# Patient Record
Sex: MALE | Race: OTHER | NOT HISPANIC OR LATINO
[De-identification: names, ages, dates, MRNs, and addresses within clinical notes are randomized per-mention and may not be internally consistent; named-entity substitution may affect disease eponyms.]

---

## 2021-05-11 PROBLEM — Z00.00 ENCOUNTER FOR PREVENTIVE HEALTH EXAMINATION: Status: ACTIVE | Noted: 2021-05-11

## 2021-05-12 ENCOUNTER — APPOINTMENT (OUTPATIENT)
Dept: OTOLARYNGOLOGY | Facility: CLINIC | Age: 21
End: 2021-05-12
Payer: COMMERCIAL

## 2021-05-12 VITALS
HEART RATE: 57 BPM | HEIGHT: 75 IN | TEMPERATURE: 96.6 F | DIASTOLIC BLOOD PRESSURE: 85 MMHG | BODY MASS INDEX: 21.88 KG/M2 | SYSTOLIC BLOOD PRESSURE: 141 MMHG | WEIGHT: 176 LBS

## 2021-05-12 DIAGNOSIS — Z78.9 OTHER SPECIFIED HEALTH STATUS: ICD-10-CM

## 2021-05-12 PROCEDURE — 31231 NASAL ENDOSCOPY DX: CPT

## 2021-05-12 PROCEDURE — 99204 OFFICE O/P NEW MOD 45 MIN: CPT | Mod: 25

## 2021-05-12 NOTE — DATA REVIEWED
[de-identified] : CT Sinus 4/2020:\par FINDINGS:\par \par The bones adjacent to the sinuses including the cribriform plate, tegmen ethmoidalis, and lamina papyracea are intact. There is a symmetric configuration to the sinocranial junction. \par \par There is noted pneumatization of the anterior clinoid process on the left. There is a sellar pattern of sphenoid sinus pneumatization on the left. Pneumatization of the left sphenoid sinus extends into the left pterygoid process and greater wing of sphenoid. There is air cell pneumatization noted superior to the notches for the anterior ethmoid arteries.\par \par The frontal sinuses demonstrate peripheral mucosal thickening, predominantly along the floors of the sinuses. Mucosal thickening partially occludes the right frontal ethmoidal recess along its upper aspect. Mucosal thickening narrows the upper portion of the left frontal ethmoidal recess. Agger nasi cells are clear. Agger nasi/frontal cells result in anatomic frontal sinus outflow tract narrowing on the right.\par \par The ethmoid sinuses demonstrate scattered foci mucosal thickening in the anterior posterior ethmoid sinus air cells. The medial walls of the anterior ethmoid labyrinth contact the lateral aspect of the vertical bony struts of the middle turbinates. \par \par The maxillary sinuses demonstrate bilateral mucus retention cysts/polyps. Also noted is mucosal thickening along the alveolar recess of the left maxillary sinus which measures 4 mm. Mucosal thickening occludes the right maxillary ostium and narrows the right infundibulum. Mucosal thickening narrows the left maxillary ostium. Left infundibulum is patent Prominent ethmoid bulla results in anatomic narrowing of the ostiomeatal units. Mucosal thickening results in partial opacification of the middle meati.\par \par Left sphenoid sinus is dominant and crosses the midline.. Mucosal thickening noted along the anterior walls of the sphenoid sinuses with occlusion of the sphenoid ostia. Mucosal thickening narrows the left sphenoethmoidal recess and occludes the right sphenoethmoidal recess. The shared osseous partitions of the sphenoid sinuses with the carotid canals are well mineralized.\par \par There is noted mild undulating deviation of the nasal septum. Assessment of the severity of any cartilaginous septal deviation is better accomplished by direct visualization . There is a mucosal hypertrophy of the inferior turbinates and middle turbinates . There is extensive mucosal contact between the turbinates and the adjacent nasal septum resulting in partial obstruction of the nasal air passages.\par \par The visualized intraorbital, intracranial structures and high nasopharyngeal soft tissues are unremarkable. Tympanomastoid cavities are free of mucosal disease.\par \par IMPRESSION: \par \par Nasal turbinate hypertrophy.\par \par Multifocal inflammatory changes are noted in the paranasal sinuses and sinus drainage pathways.\par \par Undulating deviation of nasal septum.\par \par Anatomic variants which may predispose to impaired drainage of the paranasal sinuses and recurrent/persistent episodes of inflammatory disease.

## 2021-05-12 NOTE — ASSESSMENT
[FreeTextEntry1] : 20M here for initial evaluation. For years, he c/o nasal congestion/obstruction where he is never able to breathe from his nose. This affects both sides equally. He has tried all sorts of nasal sprays without improvement. There is no pain, green drainage or foul nasal odor. He is unable to smell due to the obstruction and his sleep quality is terrible. There are no known allergies, no hayfever sx.\par CT sinus shows severe inferior turbinate hypertrophy w osteitic bone, polypoid osteomeatal obstruction, narrow nasal cavities w mild left septal deflection and scattered anterior ethmoid and right frontal outflow mucosal thickening. On exam, nasal endoscopy shows severe inferior turbinate hypertrophy w complete nasal airway obstruction; they only decongest mildly well. There is diffuse boggy sinonasal mucosal edema and polypoid change of both middle turbinates w middle meatal obstruction and clear mucus.\par He has severe nasal obstruction primarily from huge inferior turbinates, severe rhinitis and central compartment polyps. There is also mild septal deviation contributing, given his narrow nasal cavities.\par At this point, I would recommend surgery (turbinate reduction, septoplasty, ESS) to maximize nasal airway and middle meatal patency and improve his persistent sx. All questions answered and he wishes to proceed. Plan for OR in the next 1-2 months.

## 2021-05-12 NOTE — PROCEDURE
[FreeTextEntry3] : Nasal Endoscopy:\par severe turbinate hypertrophy w complete nasal airway obstruction --> decongest mildly well\par diffuse boggy sinonasal mucosal edema\par polypoid change of both MTs w middle meatal obstruction\par clear mucus\par no mucopus or polyps

## 2021-05-12 NOTE — HISTORY OF PRESENT ILLNESS
[de-identified] : 20M here for initial evaluation.\par \par For years, he c/o nasal congestion/obstruction where he is never able to breathe from his nose. This affects both sides equally. He has tried all sorts of nasal sprays without improvement. There is no pain, green drainage or foul nasal odor. He is unable to smell due to the obstruction and his sleep quality is terrible.\par No known allergies, no hayfever sx.\par \par CT Sinus 4/1/21 (I reviewed images):\par -severe inferior turbinate hypertrophy w osteitic bone\par -polypoid OMC obstruction and polypoid change of MTs\par -narrow nasal cavities w mild left septal deflection\par -scattered anterior ethmoid and right frontal outflow mucosal thickening\par \par ROS otherwise unremarkable.

## 2021-05-12 NOTE — CONSULT LETTER
[Dear  ___] : Dear  [unfilled], [Courtesy Letter:] : I had the pleasure of seeing your patient, [unfilled], in my office today. [Consult Closing:] : Thank you very much for allowing me to participate in the care of this patient.  If you have any questions, please do not hesitate to contact me. [Sincerely,] : Sincerely, [FreeTextEntry3] : Faraz Betancur MD\par Department of Otolaryngology - Head and Neck Surgery\par [facility]

## 2021-06-11 ENCOUNTER — NON-APPOINTMENT (OUTPATIENT)
Age: 21
End: 2021-06-11

## 2021-06-11 ENCOUNTER — APPOINTMENT (OUTPATIENT)
Dept: INTERNAL MEDICINE | Facility: CLINIC | Age: 21
End: 2021-06-11
Payer: MEDICAID

## 2021-06-11 VITALS
HEART RATE: 61 BPM | SYSTOLIC BLOOD PRESSURE: 119 MMHG | OXYGEN SATURATION: 98 % | BODY MASS INDEX: 22.01 KG/M2 | WEIGHT: 177 LBS | TEMPERATURE: 98.6 F | HEIGHT: 75 IN | DIASTOLIC BLOOD PRESSURE: 74 MMHG

## 2021-06-11 DIAGNOSIS — Z01.812 ENCOUNTER FOR PREPROCEDURAL LABORATORY EXAMINATION: ICD-10-CM

## 2021-06-11 DIAGNOSIS — Z01.818 ENCOUNTER FOR OTHER PREPROCEDURAL EXAMINATION: ICD-10-CM

## 2021-06-11 PROCEDURE — 99204 OFFICE O/P NEW MOD 45 MIN: CPT | Mod: 25

## 2021-06-11 PROCEDURE — 93000 ELECTROCARDIOGRAM COMPLETE: CPT

## 2021-06-12 LAB
ALBUMIN SERPL ELPH-MCNC: 4.7 G/DL
ALP BLD-CCNC: 110 U/L
ALT SERPL-CCNC: 13 U/L
ANION GAP SERPL CALC-SCNC: 12 MMOL/L
APPEARANCE: CLEAR
APTT BLD: 33.6 SEC
AST SERPL-CCNC: 15 U/L
BASOPHILS # BLD AUTO: 0.03 K/UL
BASOPHILS NFR BLD AUTO: 0.4 %
BILIRUB SERPL-MCNC: 1.3 MG/DL
BILIRUBIN URINE: NEGATIVE
BLOOD URINE: NEGATIVE
BUN SERPL-MCNC: 12 MG/DL
CALCIUM SERPL-MCNC: 9.8 MG/DL
CHLORIDE SERPL-SCNC: 101 MMOL/L
CO2 SERPL-SCNC: 27 MMOL/L
COLOR: YELLOW
CREAT SERPL-MCNC: 0.95 MG/DL
EOSINOPHIL # BLD AUTO: 0.54 K/UL
EOSINOPHIL NFR BLD AUTO: 7 %
GLUCOSE QUALITATIVE U: NEGATIVE
GLUCOSE SERPL-MCNC: 107 MG/DL
HCT VFR BLD CALC: 46.8 %
HGB BLD-MCNC: 15.3 G/DL
IMM GRANULOCYTES NFR BLD AUTO: 0.3 %
INR PPP: 1.04 RATIO
KETONES URINE: NORMAL
LEUKOCYTE ESTERASE URINE: NEGATIVE
LYMPHOCYTES # BLD AUTO: 1.97 K/UL
LYMPHOCYTES NFR BLD AUTO: 25.6 %
MAN DIFF?: NORMAL
MCHC RBC-ENTMCNC: 29.5 PG
MCHC RBC-ENTMCNC: 32.7 GM/DL
MCV RBC AUTO: 90.2 FL
MONOCYTES # BLD AUTO: 0.64 K/UL
MONOCYTES NFR BLD AUTO: 8.3 %
NEUTROPHILS # BLD AUTO: 4.49 K/UL
NEUTROPHILS NFR BLD AUTO: 58.4 %
NITRITE URINE: NEGATIVE
PH URINE: 5.5
PLATELET # BLD AUTO: 333 K/UL
POTASSIUM SERPL-SCNC: 4.4 MMOL/L
PROT SERPL-MCNC: 7.6 G/DL
PROTEIN URINE: NORMAL
PT BLD: 12.3 SEC
RBC # BLD: 5.19 M/UL
RBC # FLD: 12.2 %
SODIUM SERPL-SCNC: 140 MMOL/L
SPECIFIC GRAVITY URINE: 1.03
UROBILINOGEN URINE: NORMAL
WBC # FLD AUTO: 7.69 K/UL

## 2021-06-12 NOTE — HISTORY OF PRESENT ILLNESS
[No Pertinent Cardiac History] : no history of aortic stenosis, atrial fibrillation, coronary artery disease, recent myocardial infarction, or implantable device/pacemaker [No Pertinent Pulmonary History] : no history of asthma, COPD, sleep apnea, or smoking [No Adverse Anesthesia Reaction] : no adverse anesthesia reaction in self or family member [(Patient denies any chest pain, claudication, dyspnea on exertion, orthopnea, palpitations or syncope)] : Patient denies any chest pain, claudication, dyspnea on exertion, orthopnea, palpitations or syncope [Excellent (>10 METs)] : Excellent (>10 METs) [Chronic Anticoagulation] : no chronic anticoagulation [Chronic Kidney Disease] : no chronic kidney disease [Diabetes] : no diabetes [FreeTextEntry1] : turbinate reduction, septoplasty, ESS [FreeTextEntry2] : 6/28/2021 [FreeTextEntry3] : Dr. Hyatt [FreeTextEntry4] : h/o recurrent nasal congestion, TEJAS, and asnosmia from turbinate hypertrophy and mucosal thickening, no improvement w/ nasal sprays or conservative treatment. Pt now scheduled for surgical management.

## 2021-06-27 ENCOUNTER — TRANSCRIPTION ENCOUNTER (OUTPATIENT)
Age: 21
End: 2021-06-27

## 2021-06-28 ENCOUNTER — TRANSCRIPTION ENCOUNTER (OUTPATIENT)
Age: 21
End: 2021-06-28

## 2021-06-28 ENCOUNTER — OUTPATIENT (OUTPATIENT)
Dept: OUTPATIENT SERVICES | Facility: HOSPITAL | Age: 21
LOS: 1 days | Discharge: HOME | End: 2021-06-28
Payer: MEDICAID

## 2021-06-28 ENCOUNTER — APPOINTMENT (OUTPATIENT)
Dept: OTOLARYNGOLOGY | Facility: HOSPITAL | Age: 21
End: 2021-06-28

## 2021-06-28 ENCOUNTER — RESULT REVIEW (OUTPATIENT)
Age: 21
End: 2021-06-28

## 2021-06-28 LAB
GRAM STN FLD: SIGNIFICANT CHANGE UP
GRAM STN FLD: SIGNIFICANT CHANGE UP
SARS-COV-2 RNA SPEC QL NAA+PROBE: NEGATIVE — SIGNIFICANT CHANGE UP
SPECIMEN SOURCE: SIGNIFICANT CHANGE UP
SPECIMEN SOURCE: SIGNIFICANT CHANGE UP

## 2021-06-28 PROCEDURE — 88300 SURGICAL PATH GROSS: CPT | Mod: 26,59

## 2021-06-28 PROCEDURE — 88305 TISSUE EXAM BY PATHOLOGIST: CPT | Mod: 26

## 2021-06-28 PROCEDURE — 88304 TISSUE EXAM BY PATHOLOGIST: CPT | Mod: 26

## 2021-06-28 PROCEDURE — 31240 NSL/SNS NDSC CNCH BULL RESCJ: CPT | Mod: 50

## 2021-06-28 PROCEDURE — 31256 EXPLORATION MAXILLARY SINUS: CPT | Mod: 50

## 2021-06-28 PROCEDURE — 31253 NSL/SINS NDSC TOTAL: CPT | Mod: 50

## 2021-06-28 PROCEDURE — 30130 EXCISE INFERIOR TURBINATE: CPT | Mod: 50

## 2021-06-28 PROCEDURE — 30520 REPAIR OF NASAL SEPTUM: CPT

## 2021-06-28 PROCEDURE — 61782 SCAN PROC CRANIAL EXTRA: CPT

## 2021-06-28 RX ORDER — CEFUROXIME AXETIL 500 MG/1
500 TABLET ORAL
Qty: 16 | Refills: 0 | Status: ACTIVE | COMMUNITY
Start: 2021-06-28 | End: 1900-01-01

## 2021-06-28 RX ORDER — OXYCODONE AND ACETAMINOPHEN 5; 325 MG/1; MG/1
5-325 TABLET ORAL
Qty: 30 | Refills: 0 | Status: ACTIVE | COMMUNITY
Start: 2021-06-28 | End: 1900-01-01

## 2021-06-28 RX ORDER — SODIUM CHLORIDE 0.65 %
0.65 AEROSOL, SPRAY (ML) NASAL
Qty: 2 | Refills: 5 | Status: ACTIVE | COMMUNITY
Start: 2021-06-28 | End: 1900-01-01

## 2021-06-30 LAB
CULTURE RESULTS: SIGNIFICANT CHANGE UP
SPECIMEN SOURCE: SIGNIFICANT CHANGE UP

## 2021-07-04 LAB
CULTURE RESULTS: SIGNIFICANT CHANGE UP
SPECIMEN SOURCE: SIGNIFICANT CHANGE UP

## 2021-07-06 ENCOUNTER — APPOINTMENT (OUTPATIENT)
Dept: OTOLARYNGOLOGY | Facility: CLINIC | Age: 21
End: 2021-07-06
Payer: MEDICAID

## 2021-07-06 PROCEDURE — 31237 NSL/SINS NDSC SURG BX POLYPC: CPT | Mod: 58

## 2021-07-06 PROCEDURE — 99024 POSTOP FOLLOW-UP VISIT: CPT

## 2021-07-06 NOTE — CONSULT LETTER
[Dear  ___] : Dear  [unfilled], [Courtesy Letter:] : I had the pleasure of seeing your patient, [unfilled], in my office today. [Consult Closing:] : Thank you very much for allowing me to participate in the care of this patient.  If you have any questions, please do not hesitate to contact me. [Sincerely,] : Sincerely, [FreeTextEntry3] : Faraz Betancur MD\par Department of Otolaryngology - Head and Neck Surgery\par [facility]

## 2021-07-06 NOTE — PROCEDURE
[FreeTextEntry3] : doyles and propels removed\par \par Nasal Endoscopy:\par abundant coagulum and debris removed\par septum intact and midline\par nasal airways patent\par middle meati narrowed but patent\par

## 2021-07-06 NOTE — HISTORY OF PRESENT ILLNESS
[de-identified] : 20M here in first postoperative visit s/p SMR/ITR/ESS (maxillary, ethmoid, frontal) for nasal obstruction, central compartment disease and sinusitis. Intraoperatively, very osteitic middle and inferior turbinates and polypoid middle meatal disease.\par \par He is very congested since surgery with bloody drainage. He took the medication as prescribed.\par \par Pathology: pending\par \par ROS otherwise unremarkable.

## 2021-07-06 NOTE — DATA REVIEWED
[de-identified] : CT Sinus 4/2020:\par FINDINGS:\par \par The bones adjacent to the sinuses including the cribriform plate, tegmen ethmoidalis, and lamina papyracea are intact. There is a symmetric configuration to the sinocranial junction. \par \par There is noted pneumatization of the anterior clinoid process on the left. There is a sellar pattern of sphenoid sinus pneumatization on the left. Pneumatization of the left sphenoid sinus extends into the left pterygoid process and greater wing of sphenoid. There is air cell pneumatization noted superior to the notches for the anterior ethmoid arteries.\par \par The frontal sinuses demonstrate peripheral mucosal thickening, predominantly along the floors of the sinuses. Mucosal thickening partially occludes the right frontal ethmoidal recess along its upper aspect. Mucosal thickening narrows the upper portion of the left frontal ethmoidal recess. Agger nasi cells are clear. Agger nasi/frontal cells result in anatomic frontal sinus outflow tract narrowing on the right.\par \par The ethmoid sinuses demonstrate scattered foci mucosal thickening in the anterior posterior ethmoid sinus air cells. The medial walls of the anterior ethmoid labyrinth contact the lateral aspect of the vertical bony struts of the middle turbinates. \par \par The maxillary sinuses demonstrate bilateral mucus retention cysts/polyps. Also noted is mucosal thickening along the alveolar recess of the left maxillary sinus which measures 4 mm. Mucosal thickening occludes the right maxillary ostium and narrows the right infundibulum. Mucosal thickening narrows the left maxillary ostium. Left infundibulum is patent Prominent ethmoid bulla results in anatomic narrowing of the ostiomeatal units. Mucosal thickening results in partial opacification of the middle meati.\par \par Left sphenoid sinus is dominant and crosses the midline.. Mucosal thickening noted along the anterior walls of the sphenoid sinuses with occlusion of the sphenoid ostia. Mucosal thickening narrows the left sphenoethmoidal recess and occludes the right sphenoethmoidal recess. The shared osseous partitions of the sphenoid sinuses with the carotid canals are well mineralized.\par \par There is noted mild undulating deviation of the nasal septum. Assessment of the severity of any cartilaginous septal deviation is better accomplished by direct visualization . There is a mucosal hypertrophy of the inferior turbinates and middle turbinates . There is extensive mucosal contact between the turbinates and the adjacent nasal septum resulting in partial obstruction of the nasal air passages.\par \par The visualized intraorbital, intracranial structures and high nasopharyngeal soft tissues are unremarkable. Tympanomastoid cavities are free of mucosal disease.\par \par IMPRESSION: \par \par Nasal turbinate hypertrophy.\par \par Multifocal inflammatory changes are noted in the paranasal sinuses and sinus drainage pathways.\par \par Undulating deviation of nasal septum.\par \par Anatomic variants which may predispose to impaired drainage of the paranasal sinuses and recurrent/persistent episodes of inflammatory disease.

## 2021-07-06 NOTE — ASSESSMENT
[FreeTextEntry1] : 20M here in first postoperative visit s/p SMR/ITR/ESS (maxillary, ethmoid, frontal) for nasal obstruction, central compartment disease and sinusitis. Intraoperatively, very osteitic middle and inferior turbinates and polypoid middle meatal disease. He is very congested since surgery with bloody drainage. He took the medication as prescribed.\par On exam, nasal endoscopy shows well healing postoperative changes with patent nasal airways and middle meati. Abundant clot and coagulum was removed during debridement after which he felt much better.\par He is doing well. To start daily budesonide rinses. Advance activity. RTO 4 weeks in routine followup. \par \par

## 2021-07-09 LAB — SURGICAL PATHOLOGY STUDY: SIGNIFICANT CHANGE UP

## 2021-07-12 ENCOUNTER — NON-APPOINTMENT (OUTPATIENT)
Age: 21
End: 2021-07-12

## 2021-07-26 ENCOUNTER — RX RENEWAL (OUTPATIENT)
Age: 21
End: 2021-07-26

## 2021-07-26 RX ORDER — METHYLPREDNISOLONE 4 MG/1
4 TABLET ORAL
Qty: 1 | Refills: 0 | Status: ACTIVE | COMMUNITY
Start: 2021-06-28 | End: 1900-01-01

## 2021-07-27 ENCOUNTER — RX RENEWAL (OUTPATIENT)
Age: 21
End: 2021-07-27

## 2021-08-02 ENCOUNTER — RX RENEWAL (OUTPATIENT)
Age: 21
End: 2021-08-02

## 2021-08-03 ENCOUNTER — APPOINTMENT (OUTPATIENT)
Dept: OTOLARYNGOLOGY | Facility: CLINIC | Age: 21
End: 2021-08-03

## 2021-10-08 ENCOUNTER — RX RENEWAL (OUTPATIENT)
Age: 21
End: 2021-10-08

## 2021-10-25 ENCOUNTER — RX RENEWAL (OUTPATIENT)
Age: 21
End: 2021-10-25

## 2021-11-14 ENCOUNTER — TRANSCRIPTION ENCOUNTER (OUTPATIENT)
Age: 21
End: 2021-11-14

## 2021-11-22 ENCOUNTER — APPOINTMENT (OUTPATIENT)
Dept: OTOLARYNGOLOGY | Facility: CLINIC | Age: 21
End: 2021-11-22
Payer: MEDICAID

## 2021-11-22 DIAGNOSIS — J34.3 HYPERTROPHY OF NASAL TURBINATES: ICD-10-CM

## 2021-11-22 DIAGNOSIS — J33.9 NASAL POLYP, UNSPECIFIED: ICD-10-CM

## 2021-11-22 DIAGNOSIS — J34.89 OTHER SPECIFIED DISORDERS OF NOSE AND NASAL SINUSES: ICD-10-CM

## 2021-11-22 PROCEDURE — 31231 NASAL ENDOSCOPY DX: CPT

## 2021-11-22 PROCEDURE — 99024 POSTOP FOLLOW-UP VISIT: CPT

## 2021-11-22 RX ORDER — PREDNISONE 10 MG/1
10 TABLET ORAL
Qty: 20 | Refills: 0 | Status: ACTIVE | COMMUNITY
Start: 2021-11-22 | End: 1900-01-01

## 2021-11-22 RX ORDER — BUDESONIDE 0.5 MG/2ML
0.5 INHALANT ORAL
Qty: 1 | Refills: 2 | Status: ACTIVE | COMMUNITY
Start: 2021-07-06 | End: 1900-01-01

## 2021-11-22 NOTE — PROCEDURE
[FreeTextEntry3] : Nasal Endoscopy:\par nasal airways patent\par septum intact and midline\par middle meati and paranasal sinuses widely patent\par mucoid debris suctioned from either OMC; mild polypoid change to both MTs\par olfactory clefts patent\par no mucopus\par

## 2021-11-22 NOTE — ASSESSMENT
[FreeTextEntry1] : 21M here in second postoperative visit s/p SMR/ITR/ESS (maxillary, ethmoid, frontal) for nasal obstruction, central compartment disease and sinusitis. Intraoperatively, very osteitic middle and inferior turbinates and polypoid middle meatal disease. He missed his second postop appointment since he had to leave the country for emergency. He is doing well since last seen over 4 months ago. He only used the budesonide rinses for around 1 month postop and then stopped. Nasal breathing is markedly improved since surgery and he feels great. However, he does think his sense of smell is diminished and dull; taste is strong. He has no other complaints. On exam, nasal endoscopy shows mild mucoid debris in either OMC which was suctioned with mild polypoid change to both middle turbinates; but otherwise there are well healed postoperative changes with patent nasal airways, middle meati, olfactory clefts and paranasal sinuses.\par Despite noncompliance (missing second postop visit and not using steroid rinses), nasal endoscopy looks good with patent nasal airways, middle meati and paranasal sinuses. There is mild polypoid change of either MT, but nonobstructive. I am unsure as to his hyposmia since the olfactory cleft is surgical miles away and endoscopy looks great. Perhaps this is postoperative edema? For now, reiterated importance in daily steroid rinses - will restart budesonide. Will also kickstart and given prednisone taper (which may help hyposmia). RTO 1 month in routine followup

## 2021-11-22 NOTE — CONSULT LETTER
[Dear  ___] : Dear  [unfilled], [Courtesy Letter:] : I had the pleasure of seeing your patient, [unfilled], in my office today. [Consult Closing:] : Thank you very much for allowing me to participate in the care of this patient.  If you have any questions, please do not hesitate to contact me. [Sincerely,] : Sincerely, [FreeTextEntry3] : Faraz Betancur MD\par Department of Otolaryngology - Head and Neck Surgery\par [facility]

## 2021-11-22 NOTE — HISTORY OF PRESENT ILLNESS
[de-identified] : 21M here in second postoperative visit s/p SMR/ITR/ESS (maxillary, ethmoid, frontal) for nasal obstruction, central compartment disease and sinusitis. Intraoperatively, very osteitic middle and inferior turbinates and polypoid middle meatal disease. He missed his second postop appointment since he had to leave the country for emergency.\par \par He is doing well since last seen over 4 months ago. He only used the budesonide rinses for around 1 month postop and then stopped. Nasal breathing is markedly improved since surgery and he feels great. However, he does think his sense of smell is diminished and dull. Taste is strong. He has no other complaints.\par \par Pathology:\par 1. Left sinus contents:\par - Sinonasal mucosa with mild chronic inflammation.\par 2. Nasal septum, septoplasty:\par - Fragments of bone and cartilage with no abnormality on gross examination.\par 3. Right sinus contents:\par - Sinonasal mucosa with mild chronic inflammation.\par 4. Left turbinate:\par - Nasal turbinate with mild chronic inflammation.\par 5. Right turbinate:\par - Nasal turbinate with mild chronic inflammation.\par 6. Left and right sinus shavings:\par - Sinonasal inflammatory polyp.\par - Sinonasal mucosa with mild chronic inflammation.\par \par ROS otherwise unremarkable.

## 2021-11-22 NOTE — DATA REVIEWED
[de-identified] : CT Sinus 4/2020:\par FINDINGS:\par \par The bones adjacent to the sinuses including the cribriform plate, tegmen ethmoidalis, and lamina papyracea are intact. There is a symmetric configuration to the sinocranial junction. \par \par There is noted pneumatization of the anterior clinoid process on the left. There is a sellar pattern of sphenoid sinus pneumatization on the left. Pneumatization of the left sphenoid sinus extends into the left pterygoid process and greater wing of sphenoid. There is air cell pneumatization noted superior to the notches for the anterior ethmoid arteries.\par \par The frontal sinuses demonstrate peripheral mucosal thickening, predominantly along the floors of the sinuses. Mucosal thickening partially occludes the right frontal ethmoidal recess along its upper aspect. Mucosal thickening narrows the upper portion of the left frontal ethmoidal recess. Agger nasi cells are clear. Agger nasi/frontal cells result in anatomic frontal sinus outflow tract narrowing on the right.\par \par The ethmoid sinuses demonstrate scattered foci mucosal thickening in the anterior posterior ethmoid sinus air cells. The medial walls of the anterior ethmoid labyrinth contact the lateral aspect of the vertical bony struts of the middle turbinates. \par \par The maxillary sinuses demonstrate bilateral mucus retention cysts/polyps. Also noted is mucosal thickening along the alveolar recess of the left maxillary sinus which measures 4 mm. Mucosal thickening occludes the right maxillary ostium and narrows the right infundibulum. Mucosal thickening narrows the left maxillary ostium. Left infundibulum is patent Prominent ethmoid bulla results in anatomic narrowing of the ostiomeatal units. Mucosal thickening results in partial opacification of the middle meati.\par \par Left sphenoid sinus is dominant and crosses the midline.. Mucosal thickening noted along the anterior walls of the sphenoid sinuses with occlusion of the sphenoid ostia. Mucosal thickening narrows the left sphenoethmoidal recess and occludes the right sphenoethmoidal recess. The shared osseous partitions of the sphenoid sinuses with the carotid canals are well mineralized.\par \par There is noted mild undulating deviation of the nasal septum. Assessment of the severity of any cartilaginous septal deviation is better accomplished by direct visualization . There is a mucosal hypertrophy of the inferior turbinates and middle turbinates . There is extensive mucosal contact between the turbinates and the adjacent nasal septum resulting in partial obstruction of the nasal air passages.\par \par The visualized intraorbital, intracranial structures and high nasopharyngeal soft tissues are unremarkable. Tympanomastoid cavities are free of mucosal disease.\par \par IMPRESSION: \par \par Nasal turbinate hypertrophy.\par \par Multifocal inflammatory changes are noted in the paranasal sinuses and sinus drainage pathways.\par \par Undulating deviation of nasal septum.\par \par Anatomic variants which may predispose to impaired drainage of the paranasal sinuses and recurrent/persistent episodes of inflammatory disease.

## 2021-11-29 ENCOUNTER — RX RENEWAL (OUTPATIENT)
Age: 21
End: 2021-11-29

## 2021-12-28 ENCOUNTER — APPOINTMENT (OUTPATIENT)
Dept: OTOLARYNGOLOGY | Facility: CLINIC | Age: 21
End: 2021-12-28

## 2022-01-24 ENCOUNTER — RX RENEWAL (OUTPATIENT)
Age: 22
End: 2022-01-24

## 2022-02-21 ENCOUNTER — RX RENEWAL (OUTPATIENT)
Age: 22
End: 2022-02-21

## 2022-03-21 ENCOUNTER — RX RENEWAL (OUTPATIENT)
Age: 22
End: 2022-03-21

## 2022-05-16 ENCOUNTER — RX RENEWAL (OUTPATIENT)
Age: 22
End: 2022-05-16

## 2022-05-17 ENCOUNTER — RX RENEWAL (OUTPATIENT)
Age: 22
End: 2022-05-17

## 2022-06-13 ENCOUNTER — RX RENEWAL (OUTPATIENT)
Age: 22
End: 2022-06-13

## 2022-07-11 ENCOUNTER — RX RENEWAL (OUTPATIENT)
Age: 22
End: 2022-07-11

## 2022-07-11 RX ORDER — BUDESONIDE 0.5 MG/2ML
0.5 INHALANT ORAL
Qty: 120 | Refills: 5 | Status: ACTIVE | COMMUNITY
Start: 2022-01-24 | End: 1900-01-01

## 2022-07-14 ENCOUNTER — RX RENEWAL (OUTPATIENT)
Age: 22
End: 2022-07-14

## 2022-09-09 ENCOUNTER — RX RENEWAL (OUTPATIENT)
Age: 22
End: 2022-09-09

## 2022-10-07 ENCOUNTER — RX RENEWAL (OUTPATIENT)
Age: 22
End: 2022-10-07

## 2022-11-07 ENCOUNTER — RX RENEWAL (OUTPATIENT)
Age: 22
End: 2022-11-07

## 2022-12-05 ENCOUNTER — RX RENEWAL (OUTPATIENT)
Age: 22
End: 2022-12-05

## 2022-12-08 ENCOUNTER — RX RENEWAL (OUTPATIENT)
Age: 22
End: 2022-12-08

## 2023-02-20 ENCOUNTER — RX RENEWAL (OUTPATIENT)
Age: 23
End: 2023-02-20

## 2023-04-11 ENCOUNTER — RX RENEWAL (OUTPATIENT)
Age: 23
End: 2023-04-11

## 2023-05-22 ENCOUNTER — RX RENEWAL (OUTPATIENT)
Age: 23
End: 2023-05-22

## 2023-06-19 ENCOUNTER — RX RENEWAL (OUTPATIENT)
Age: 23
End: 2023-06-19

## 2023-06-19 RX ORDER — SODIUM CHLORIDE FOR INHALATION 0.9 %
0.9 VIAL, NEBULIZER (ML) INHALATION
Qty: 300 | Refills: 5 | Status: ACTIVE | COMMUNITY
Start: 2021-08-02 | End: 1900-01-01

## 2023-12-16 ENCOUNTER — EMERGENCY (EMERGENCY)
Facility: HOSPITAL | Age: 23
LOS: 1 days | Discharge: HOME | End: 2023-12-16
Attending: EMERGENCY MEDICINE | Admitting: EMERGENCY MEDICINE
Payer: COMMERCIAL

## 2023-12-16 VITALS
TEMPERATURE: 98 F | DIASTOLIC BLOOD PRESSURE: 79 MMHG | SYSTOLIC BLOOD PRESSURE: 147 MMHG | OXYGEN SATURATION: 96 % | RESPIRATION RATE: 17 BRPM | HEART RATE: 89 BPM | WEIGHT: 179.9 LBS

## 2023-12-16 VITALS
HEART RATE: 76 BPM | SYSTOLIC BLOOD PRESSURE: 147 MMHG | OXYGEN SATURATION: 98 % | TEMPERATURE: 98 F | DIASTOLIC BLOOD PRESSURE: 82 MMHG | RESPIRATION RATE: 18 BRPM

## 2023-12-16 DIAGNOSIS — F17.290 NICOTINE DEPENDENCE, OTHER TOBACCO PRODUCT, UNCOMPLICATED: ICD-10-CM

## 2023-12-16 DIAGNOSIS — R51.9 HEADACHE, UNSPECIFIED: ICD-10-CM

## 2023-12-16 DIAGNOSIS — Z98.890 OTHER SPECIFIED POSTPROCEDURAL STATES: Chronic | ICD-10-CM

## 2023-12-16 PROCEDURE — 99284 EMERGENCY DEPT VISIT MOD MDM: CPT | Mod: 25

## 2023-12-16 PROCEDURE — 70450 CT HEAD/BRAIN W/O DYE: CPT | Mod: MA

## 2023-12-16 PROCEDURE — 70450 CT HEAD/BRAIN W/O DYE: CPT | Mod: 26,MA

## 2023-12-16 PROCEDURE — 96372 THER/PROPH/DIAG INJ SC/IM: CPT

## 2023-12-16 PROCEDURE — 99284 EMERGENCY DEPT VISIT MOD MDM: CPT

## 2023-12-16 RX ORDER — KETOROLAC TROMETHAMINE 30 MG/ML
30 SYRINGE (ML) INJECTION ONCE
Refills: 0 | Status: DISCONTINUED | OUTPATIENT
Start: 2023-12-16 | End: 2023-12-16

## 2023-12-16 RX ADMIN — Medication 30 MILLIGRAM(S): at 17:55

## 2023-12-16 RX ADMIN — Medication 30 MILLIGRAM(S): at 17:39

## 2023-12-16 NOTE — ED PROVIDER NOTE - OBJECTIVE STATEMENT
22yo M here w/ several days of HA, generalized, but now more pronounced in occipital region. started gradually, worse at the end of the day. denies neck stiffness, fevers/chills, neurological symptoms, N/V. worried about bleeding in the brain or tumor. also concerned that he is vaping too much and it could be causing him symptoms.   no FH of sudden death, aneurysms, brain tumor

## 2023-12-16 NOTE — ED ADULT NURSE REASSESSMENT NOTE - NS ED NURSE REASSESS COMMENT FT1
Patient a/oX 3, anxious, c/o of back of head pain/headache, no dizzness, no neuro deficits.  Vital signs stble.  CT scan pending.

## 2023-12-16 NOTE — ED PROVIDER NOTE - CLINICAL SUMMARY MEDICAL DECISION MAKING FREE TEXT BOX
will check ct head to r/o mass or bleed, low suspicion however, does not warrant LP/CT angio at this time given lack of thunderclap, family hx, neurological symptoms  encouraged pt to stop vaping, and to anticipate withdrawal symptoms

## 2023-12-16 NOTE — ED ADULT NURSE REASSESSMENT NOTE - NS ED NURSE REASSESS COMMENT FT1
Patient c/o of continued chest tightness , wheezing noted, c/o of SOB, no difficulty speaking in long sentences, not in distress.  CTA pending.

## 2023-12-16 NOTE — ED PROVIDER NOTE - PHYSICAL EXAMINATION
CONSTITUTIONAL: Well-appearing; well-nourished; in no apparent distress.   HEAD: Normocephalic; atraumatic.   EYES:  conjunctiva and sclera clear  ENT: normal nose; no rhinorrhea;  NECK: Supple; full ROM  RESPIRATORY: Breathing easily; no resp difficulty  EXT: No cyanosis or edema;  SKIN: Normal for age and race; warm; dry; good turgor; no apparent lesions or rash.   NEURO: A & O x 3; face symmetric; gait steady, speech clear, motor/sensory intact in all extremities  PSYCHOLOGICAL: The patient’s mood and manner are appropriate.

## 2023-12-16 NOTE — ED ADULT NURSE NOTE - NSFALLUNIVINTERV_ED_ALL_ED
Bed/Stretcher in lowest position, wheels locked, appropriate side rails in place/Call bell, personal items and telephone in reach/Instruct patient to call for assistance before getting out of bed/chair/stretcher/Non-slip footwear applied when patient is off stretcher/[place] to call system/Physically safe environment - no spills, clutter or unnecessary equipment/Purposeful proactive rounding/Room/bathroom lighting operational, light cord in reach Bed/Stretcher in lowest position, wheels locked, appropriate side rails in place/Call bell, personal items and telephone in reach/Instruct patient to call for assistance before getting out of bed/chair/stretcher/Non-slip footwear applied when patient is off stretcher/[place] to call system/Physically safe environment - no spills, clutter or unnecessary equipment/Purposeful proactive rounding/Room/bathroom lighting operational, light cord in reach

## 2023-12-16 NOTE — ED ADULT NURSE NOTE - OBJECTIVE STATEMENT
Patient c/o of pounding headache at back of head, X 5 days,  no nausea/vomitting, no dizziness, no blurring of vision, no neuro deficits.  States OTC meds not working. last took Tylenol PO 9 am today.  Denies any PMHx.

## 2024-01-08 NOTE — ED ADULT NURSE NOTE - CINV DISCH MEDS REVIEWED YN
Pt's family reports that he has had foul smelling urine for a week. Woke up confused this morning . Pt appears alert to name and .    Yes